# Patient Record
Sex: FEMALE | Race: WHITE | Employment: FULL TIME | URBAN - METROPOLITAN AREA
[De-identification: names, ages, dates, MRNs, and addresses within clinical notes are randomized per-mention and may not be internally consistent; named-entity substitution may affect disease eponyms.]

---

## 2020-02-26 ENCOUNTER — ED HISTORICAL/CONVERTED ENCOUNTER (OUTPATIENT)
Dept: OTHER | Age: 20
End: 2020-02-26

## 2021-12-29 LAB
CHLAMYDIA, EXTERNAL: NEGATIVE
N. GONORRHEA, EXTERNAL: NEGATIVE
PAP SMEAR, EXTERNAL: NORMAL

## 2022-03-22 ENCOUNTER — TELEPHONE (OUTPATIENT)
Dept: OBGYN CLINIC | Age: 22
End: 2022-03-22

## 2022-03-22 NOTE — TELEPHONE ENCOUNTER
Call received at 1:05PM      24year old patient reports her LMP to be 1/5/2022 ( 10w 6 d)      Patient has her first ob visit and ultrasound on 3/28/2022    Patient denies vaginal bleeding and slight tighting in her pelvic region. Patient reports she is struggling with nausea, feeling weak and lethargic . Patient reports , patient reports she is sipping on fluids, eats few bites and than has to stop due to nausea.     Patient reports she is voiding but her urine is dark    Patient is concerned about dehydration    Patient was advised to try vitamin b 6 and Unisom for the nausea, sipping on ginger ale, Pedialyte , Pedialyte popsicle crackers and toast        Pharmacy confirmed    Please advise    Thank you

## 2022-03-23 NOTE — TELEPHONE ENCOUNTER
Mychal Domingo MD  You 15 hours ago (5:15 PM)     GM    I agree with your plan.  We can do diclegis but I would try OTC first.           This nurse attempted to reach the patient and left a detailed message for the patient regarding MD recommendations and to call or my chart any further questions.

## 2022-03-28 ENCOUNTER — INITIAL PRENATAL (OUTPATIENT)
Dept: OBGYN CLINIC | Age: 22
End: 2022-03-28

## 2022-03-28 VITALS
DIASTOLIC BLOOD PRESSURE: 60 MMHG | BODY MASS INDEX: 20.41 KG/M2 | HEIGHT: 63 IN | WEIGHT: 115.2 LBS | SYSTOLIC BLOOD PRESSURE: 90 MMHG

## 2022-03-28 DIAGNOSIS — Z3A.12 PREGNANCY WITH 12 COMPLETED WEEKS GESTATION: ICD-10-CM

## 2022-03-28 DIAGNOSIS — Z34.90 PREGNANCY, UNSPECIFIED GESTATIONAL AGE: Primary | ICD-10-CM

## 2022-03-28 PROBLEM — F60.3 BORDERLINE PERSONALITY DISORDER (HCC): Status: ACTIVE | Noted: 2022-03-28

## 2022-03-28 PROBLEM — F41.8 MIXED ANXIETY AND DEPRESSIVE DISORDER: Status: ACTIVE | Noted: 2022-03-28

## 2022-03-28 PROBLEM — F39 EPISODIC MOOD DISORDER (HCC): Status: ACTIVE | Noted: 2022-03-28

## 2022-03-28 PROBLEM — F43.20 ADJUSTMENT REACTION: Status: ACTIVE | Noted: 2022-03-28

## 2022-03-28 PROCEDURE — 0501F PRENATAL FLOW SHEET: CPT | Performed by: OBSTETRICS & GYNECOLOGY

## 2022-03-28 RX ORDER — ONDANSETRON 4 MG/1
4 TABLET, ORALLY DISINTEGRATING ORAL
Qty: 30 TABLET | Refills: 1 | Status: SHIPPED | OUTPATIENT
Start: 2022-03-28

## 2022-03-28 RX ORDER — FAMOTIDINE 40 MG/1
40 TABLET, FILM COATED ORAL DAILY
COMMUNITY
Start: 2022-02-03

## 2022-03-28 RX ORDER — CEPHALEXIN 500 MG/1
500 CAPSULE ORAL 2 TIMES DAILY
COMMUNITY
Start: 2022-03-23

## 2022-03-28 NOTE — PROGRESS NOTES
Current pregnancy history:    Yuliana Horn is a ,  24 y.o. female [address] [facility] Patient's last menstrual period was 2022. .  She presents for the evaluation of amenorrhea and a positive pregnancy test.    LMP history:  The date of her LMP is 1806 certain. Her last menstrual period was normal and lasted for 4 to 5 days. A urine pregnancy test was positive 1/ weeks ago. She was not on the pill at conception. Based on her LMP, her EDC is 10/8/2022 and her EGA is 12 weeks,2 days. Her menstrual cycles are regular and occur approximately every 28 days  and range from 3 to 5 days. The last menses lasted  the usual number of days. Ultrasound data:  She had an  ultrasound done by the ultrasound tech today which revealed a viable mcguire pregnancy with a gestational age of 16 weeks and 1 days giving an [address] of 10/09/2022. Pregnancy symptoms:    Since her LMP she has experienced  urinary frequency, breast tenderness, and nausea. She has not been vomiting over the last few weeks. Associated signs and symptoms which she denies: dysuria, discharge, vaginal bleeding. She states she has gained weight:  Approximately 5 pounds over the last few weeks. Relevant past pregnancy history:   She has the following pregnancy history: Her last pregnancy was uncomplicated. She has no history of  delivery. Relevant past medical history:(relevant to this pregnancy): noncontributory. Pap/Occupational history:  Last pap smear: last year Results: Normal      Her occupation is: Pharmacy Tech        Substance history: negative for alcohol, tobacco and street drugs. Positive for nothing. Exposure history: There is/are no indoor cat/s in the home. The patient was instructed to not change the cat litter. She admits close contact with children on a regular basis. She has had chicken pox or the vaccine in the past.   Patient denies issues with domestic violence.      Genetic Screening/Teratology Counseling: (Includes patient, baby's father, or anyone in either family with:)  3.  Patient's age >/= 28 at [facility]?-- no  .   2. Thalassemia ([facility] [place], [address], or  background): MCV<80?--no.     3.  Neural tube defect (meningomyelocele, spina bifida, anencephaly)?--no.   4.  Congenital heart defect?--no.  5.  Down syndrome?--no.   6.  Arturo-Sachs ([place], [place])?--no.   7.  Canavan's Disease?--no.   8.  Familial Dysautonomia?--no.   9.  Sickle cell disease or trait ()? --no   The patient has not been tested for sickle trait  10. Hemophilia or other blood disorders?--no. 11.  Muscular dystrophy?--no. 12.  Cystic fibrosis?--no. 13.  [place]'s Chorea?--no. 14.  Mental retardation/autism (if yes was person tested for Fragile X)?--no. 15.  Other inherited genetic or chromosomal disorder?--no. 12.  Maternal metabolic disorder (DM, PKU, etc)?--no. 17.  Patient or FOB with a child with a birth defect not listed above?--no.  17a. Patient or FOB with a birth defect themselves?--no. 18.  Recurrent pregnancy loss, or stillbirth?--no. 19.  Any medications since LMP other than prenatal vitamins (include vitamins, supplements, OTC meds, drugs, alcohol)?--no. 20.  Any other genetic/environmental exposure to discuss?--no. Infection History:  1. Lives with someone with TB or TB exposed?--no.   2.  Patient or partner has history of genital herpes?--no.  3.  Rash or viral illness since LMP?--no.    4.  History of STD (GC, CT, HPV, syphilis, HIV)? --no   5. Other: OTHER?       Patient Active Problem List   Diagnosis Code    Borderline personality disorder ([facility] [address] F60.3    Adjustment reaction F43.20    Episodic mood disorder (HCC) F39    Mixed anxiety and depressive disorder F41.8     Past Medical History:   Diagnosis Date    Anxiety     Depression     h/o cutting    Dysmenorrhea     GERD (gastroesophageal reflux disease)     PMS (premenstrual syndrome) Past Surgical History:   Procedure Laterality Date    HX DILATION AND CURETTAGE  2019     OB History    Para Term  AB Living   3 1 1 0 1 0   SAB IAB Ectopic Molar Multiple Live Births   1 0 0 0 0 1      # Outcome Date GA Lbr Naman/2nd Weight Sex Delivery Anes PTL Lv   3 Current            2 SAB 19           1 Term 19 40w0d  7 lb 2 oz (3.232 kg) F Vag-Spont EPI  DEC     Gyn Flowsheet:  GYN HISTORY 3/28/2022   Pap Date 2021   Pap Results WNL GC/CHL Neg     Social History     Socioeconomic History    Marital status:    Occupational History    Occupation: Pharmacy Tech   Tobacco Use    Smoking status: Never Smoker    Smokeless tobacco: Never Used   Vaping Use    Vaping Use: Never used   Substance and Sexual Activity    Alcohol use: Yes    Drug use: Never    Sexual activity: Yes     Partners: Male     Birth control/protection: None      History reviewed. No pertinent family history. Current Outpatient Medications on File Prior to Visit   Medication Sig Dispense Refill    cephALEXin (KEFLEX) 500 mg capsule Take 500 mg by mouth two (2) times a day.  famotidine (PEPCID) 40 mg tablet Take 40 mg by mouth daily. No current facility-administered medications on file prior to visit.      No Known Allergies    Review of Systems - History obtained from the patient-negative for:  Constitutional: weight loss, fever, night sweats  HEENT: hearing loss, earache, congestion, snoring, sorethroat  CV: chest pain, palpitations, edema  Resp: cough, shortness of breath, wheezing  Breast: breast lumps, nipple discharge, galactorrhea  GI: change in bowel habits, abdominal pain, black or bloody stools  : frequency, dysuria, hematuria, vaginal discharge  MSK: back pain, joint pain, muscle pain  Skin: itching, rash, hives  Neuro: dizziness, headache, confusion, weakness  Psych: anxiety, depression, change in mood  Heme/lymph: bleeding, bruising, pallor      Objective:  Visit Vitals  LMP 01/05/2022       Physical Exam:     Constitutional  · Appearance: well-nourished, well developed, alert, in no acute distress    HENT  · Head  · Face: appears normal  · Eyes: appear normal  · Ears: normal  · Mouth: normal  · Lips: no lesions    Neck  · Inspection/Palpation: normal appearance, no masses or tenderness  · Lymph Nodes: no lymphadenopathy present  · Thyroid: gland size normal, nontender, no nodules or masses present on palpation    Chest  · Respiratory Effort: breathing unlabored  · Auscultation: normal breath sounds    Cardiovascular  · Heart:  · Auscultation: regular rate and rhythm without murmur      Gastrointestinal  · Abdominal Examination: abdomen non-tender to palpation, normal bowel sounds, no masses present  · Liver and spleen: no hepatomegaly present, spleen not palpable  · Hernias: no hernias identified      Skin  · General Inspection: no rash, no lesions identified    Neurologic/Psychiatric  · Mental Status:  · Orientation: grossly oriented to person, place and time  · Mood and Affect: mood normal, affect appropriate    Assessment:   Intrauterine pregnancy with the following problems identified: none  Plan:     Offered CF testing, CVS, Nuchal Translucency, MSAFP, amnio, and discussed NIPT  Course of pregnancy discussed including visit schedule, routine U/S, glucola testing, etc.  Avoid alcoholic beverages and illicit/recreational drugs use  Take prenatal vitamins or folic acid daily. Hospital and practice style discussed with coverage system. Discussed nutrition, toxoplasmosis precautions, sexual activity, exercise, need for influenza vaccine, environmental and work hazards, travel advice, screen for domestic violence, need for seat belts. Discussed seafood, unpasteurized dairy products, deli meat, artificial sweeteners, and caffeine. Information on prenatal classes/breastfeeding given. Information on circumcision given  Patient encouraged not to smoke.   Discussed current prescription drug use. Given medication list.  Discussed the use of over the counter medications and chemicals. Route of delivery discussed, including risks, benefits, and alternatives of  versus repeat LTCS. Pt understands risk of hemorrhage during pregnancy and post delivery and would accept blood products if necessary in life-threatening emergencies    Handouts given to pt.

## 2022-03-28 NOTE — PROGRESS NOTES
TA ULTRASOUND PERFORMED  A SINGLE VIABLE 12W1D IUP IS SEEN WITH NORMAL CARDIAC RHYTHM. GESTATIONAL AGE BASED ON TODAYS ULTRASOUND. A POSTERIOR PLACENTA IS SEEN. RIGHT OVARY APPEARS WITHIN NORMAL LIMITS. LEFT OVARY APPEARS WITHIN NORMAL LIMITS. A CL CYST IS SEEN. NO FREE FLUID SEEN IN THE CDS.

## 2022-03-28 NOTE — PROGRESS NOTES
Here for first visit w US. US confirms LMP. Still some lingering nausea needs refill zofran.   Discussed medical marijuana diet/chemical exposures etc.      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

## 2022-03-29 PROBLEM — E55.9 VITAMIN D DEFICIENCY: Status: ACTIVE | Noted: 2022-03-29

## 2022-03-29 LAB
25(OH)D3+25(OH)D2 SERPL-MCNC: 20.4 NG/ML (ref 30–100)
EST. AVERAGE GLUCOSE BLD GHB EST-MCNC: 97 MG/DL
HBA1C MFR BLD: 5 % (ref 4.8–5.6)

## 2022-03-30 LAB
ABO GROUP BLD: NORMAL
BLD GP AB SCN SERPL QL: NEGATIVE
ERYTHROCYTE [DISTWIDTH] IN BLOOD BY AUTOMATED COUNT: 12 % (ref 11.7–15.4)
HBV SURFACE AG SERPL QL IA: NEGATIVE
HCT VFR BLD AUTO: 39.7 % (ref 34–46.6)
HGB BLD-MCNC: 12.8 G/DL (ref 11.1–15.9)
HIV 1+2 AB+HIV1 P24 AG SERPL QL IA: NON REACTIVE
MCH RBC QN AUTO: 32 PG (ref 26.6–33)
MCHC RBC AUTO-ENTMCNC: 32.2 G/DL (ref 31.5–35.7)
MCV RBC AUTO: 99 FL (ref 79–97)
PLATELET # BLD AUTO: 193 X10E3/UL (ref 150–450)
RBC # BLD AUTO: 4 X10E6/UL (ref 3.77–5.28)
RH BLD: POSITIVE
RPR SER QL: NON REACTIVE
RUBV IGG SERPL IA-ACNC: <0.9 INDEX
WBC # BLD AUTO: 7.1 X10E3/UL (ref 3.4–10.8)

## 2022-04-05 DIAGNOSIS — Z34.90 PREGNANCY, UNSPECIFIED GESTATIONAL AGE: Primary | ICD-10-CM

## 2022-04-05 DIAGNOSIS — O28.5 ABNORMAL GENETIC TEST DURING PREGNANCY: ICD-10-CM

## 2022-04-08 ENCOUNTER — TELEPHONE (OUTPATIENT)
Dept: OBGYN CLINIC | Age: 22
End: 2022-04-08

## 2022-04-08 NOTE — TELEPHONE ENCOUNTER
Spoke with Jennifer at [facility] gave her dx code Z34.90. She said that dx code was already on the order?

## 2022-04-08 NOTE — TELEPHONE ENCOUNTER
Call received at 11:10am    [facility] billing calling regarding needing diagnosis codes for lab work ordered on 3/28/2022        24year old patient last seen in the office on 3/28/2022    Patient is  13w6d pregnant patient        Please contact [employer] at [phone]    Thank you

## 2022-04-15 ENCOUNTER — TELEPHONE (OUTPATIENT)
Dept: OBGYN CLINIC | Age: 22
End: 2022-04-15

## 2022-04-15 NOTE — TELEPHONE ENCOUNTER
Call received at 9:30am      24year old patient last seen in the office on 3/28/2022      Patient calling to discuss the [facility] test results as reviewed by Zain Black,[address]. 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  This won't affect you but if the father of the baby happened to be a carrier as well (which is unlikely) the baby would have a 1 in 4 chance of having the full disorder which would be a problem.  This is not likely to be the case but we need to have the baby's father tested for this (which will be free).  He needs to come to the office here and register as a patient and we can get the test run. Rudi Barth I recommend that you contact [facility] genetic counsellors to discuss this issue in greater detail since they are experts in this field. You can reach the genetic counselors at [phone] or [employer]     Patient was advised to contact the genetic counselor or web site for further information     Patient was advised that her  can get set up a chart at her visit on Monday and get his blood work done at that time    Patient verbalized understanding

## 2022-04-18 ENCOUNTER — ROUTINE PRENATAL (OUTPATIENT)
Dept: OBGYN CLINIC | Age: 22
End: 2022-04-18
Payer: MEDICAID

## 2022-04-18 VITALS — BODY MASS INDEX: 20.12 KG/M2 | SYSTOLIC BLOOD PRESSURE: 100 MMHG | WEIGHT: 113.6 LBS | DIASTOLIC BLOOD PRESSURE: 60 MMHG

## 2022-04-18 DIAGNOSIS — O28.5 ABNORMAL GENETIC TEST DURING PREGNANCY: ICD-10-CM

## 2022-04-18 DIAGNOSIS — Z3A.15 15 WEEKS GESTATION OF PREGNANCY: Primary | ICD-10-CM

## 2022-04-18 PROBLEM — O09.899 RUBELLA NON-IMMUNE STATUS, ANTEPARTUM: Status: ACTIVE | Noted: 2022-04-18

## 2022-04-18 PROBLEM — Z28.39 RUBELLA NON-IMMUNE STATUS, ANTEPARTUM: Status: ACTIVE | Noted: 2022-04-18

## 2022-04-18 PROCEDURE — 0502F SUBSEQUENT PRENATAL CARE: CPT | Performed by: OBSTETRICS & GYNECOLOGY

## 2022-04-18 NOTE — PROGRESS NOTES
Discussed SLO syndrome again & FOB getting tested. Going to talk to genetic counselor this week. Nausea is better but still struggling. Still taking Zofran.   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

## 2022-04-20 LAB
AFP INTERP SERPL-IMP: NORMAL
AFP INTERP SERPL-IMP: NORMAL
AFP MOM SERPL: 1.57
AFP SERPL-MCNC: 57.8 NG/ML
AGE AT DELIVERY: 22.7 YR
COMMENT, 018013: NORMAL
GA METHOD: NORMAL
GA: 15 WEEKS
IDDM PATIENT QL: NO
MULTIPLE PREGNANCY: NO
NEURAL TUBE DEFECT RISK FETUS: 2270 %
RESULTS, 017004: NORMAL

## 2022-04-26 ENCOUNTER — TELEPHONE (OUTPATIENT)
Dept: OBGYN CLINIC | Age: 22
End: 2022-04-26

## 2022-04-26 NOTE — TELEPHONE ENCOUNTER
I ADVISED THE PATIENT THAT WE SENT THE REQUEST FOR MEDICAL RECORDS TO [facility] ON 04/21/2022. GAVE PATIENT [facility] PHONE NUMBER.

## 2022-06-28 ENCOUNTER — TELEPHONE (OUTPATIENT)
Dept: OBGYN CLINIC | Age: 22
End: 2022-06-28

## 2022-06-28 NOTE — TELEPHONE ENCOUNTER
[facility] calling to say that they got the records but not the prenatal labs    Requested labs faxed to provided fax number [phone] attention Azalia            Labs faxed with confirmation    Permission to release in media